# Patient Record
Sex: FEMALE | Race: BLACK OR AFRICAN AMERICAN | Employment: UNEMPLOYED | URBAN - METROPOLITAN AREA
[De-identification: names, ages, dates, MRNs, and addresses within clinical notes are randomized per-mention and may not be internally consistent; named-entity substitution may affect disease eponyms.]

---

## 2017-05-04 ENCOUNTER — ROUTINE PRENATAL (OUTPATIENT)
Dept: PERINATAL CARE | Age: 22
End: 2017-05-04
Payer: COMMERCIAL

## 2017-05-04 VITALS
BODY MASS INDEX: 19.85 KG/M2 | TEMPERATURE: 98.6 F | RESPIRATION RATE: 16 BRPM | SYSTOLIC BLOOD PRESSURE: 114 MMHG | DIASTOLIC BLOOD PRESSURE: 70 MMHG | HEART RATE: 76 BPM | HEIGHT: 69 IN | WEIGHT: 134 LBS

## 2017-05-04 DIAGNOSIS — Z36.86 ENCOUNTER FOR SCREENING FOR RISK OF PRE-TERM LABOR: ICD-10-CM

## 2017-05-04 DIAGNOSIS — O26.12 LOW WEIGHT GAIN IN PREGNANCY, SECOND TRIMESTER: Primary | ICD-10-CM

## 2017-05-04 DIAGNOSIS — Z3A.20 20 WEEKS GESTATION OF PREGNANCY: ICD-10-CM

## 2017-05-04 PROBLEM — O26.10 LOW WEIGHT GAIN IN PREGNANCY: Status: ACTIVE | Noted: 2017-05-04

## 2017-05-04 PROCEDURE — 76811 OB US DETAILED SNGL FETUS: CPT | Performed by: OBSTETRICS & GYNECOLOGY

## 2017-05-04 PROCEDURE — 76817 TRANSVAGINAL US OBSTETRIC: CPT | Performed by: OBSTETRICS & GYNECOLOGY

## 2017-09-18 ENCOUNTER — HOSPITAL ENCOUNTER (OUTPATIENT)
Age: 22
Setting detail: SPECIMEN
Discharge: HOME | End: 2017-09-18
Payer: COMMERCIAL

## 2017-09-19 LAB
C TRACH DNA GENITAL QL NAA+PROBE: NEGATIVE
N. GONORRHOEAE DNA: NEGATIVE

## 2017-09-21 LAB
CULTURE: NORMAL
CULTURE: NORMAL
Lab: NORMAL
SPECIMEN DESCRIPTION: NORMAL
STATUS: NORMAL

## 2018-02-05 ENCOUNTER — HOSPITAL ENCOUNTER (OUTPATIENT)
Age: 23
Setting detail: SPECIMEN
Discharge: HOME | End: 2018-02-05
Payer: COMMERCIAL

## 2018-03-18 ENCOUNTER — HOSPITAL ENCOUNTER (EMERGENCY)
Age: 23
Discharge: HOME | End: 2018-03-18
Attending: EMERGENCY MEDICINE
Payer: COMMERCIAL

## 2018-03-18 VITALS
OXYGEN SATURATION: 100 % | RESPIRATION RATE: 16 BRPM | WEIGHT: 124 LBS | BODY MASS INDEX: 18.37 KG/M2 | HEART RATE: 77 BPM | HEIGHT: 69 IN | SYSTOLIC BLOOD PRESSURE: 129 MMHG | TEMPERATURE: 98.1 F | DIASTOLIC BLOOD PRESSURE: 81 MMHG

## 2018-03-18 DIAGNOSIS — N39.0 ACUTE UTI: Primary | ICD-10-CM

## 2018-03-18 LAB
-: ABNORMAL
AMORPHOUS: ABNORMAL
BACTERIA: ABNORMAL
BILIRUBIN URINE: NEGATIVE
CASTS UA: ABNORMAL /LPF
COLOR: YELLOW
COMMENT UA: ABNORMAL
CRYSTALS, UA: ABNORMAL /HPF
EPITHELIAL CELLS UA: ABNORMAL /HPF
GLUCOSE URINE: NEGATIVE
HCG(URINE) PREGNANCY TEST: NEGATIVE
KETONES, URINE: NEGATIVE
LEUKOCYTE ESTERASE, URINE: ABNORMAL
MUCUS: ABNORMAL
NITRITE, URINE: POSITIVE
OTHER OBSERVATIONS UA: ABNORMAL
PH UA: 6 (ref 5–8)
PROTEIN UA: ABNORMAL
RBC UA: ABNORMAL /HPF
RENAL EPITHELIAL, UA: ABNORMAL /HPF
SPECIFIC GRAVITY UA: 1.02 (ref 1–1.03)
TRICHOMONAS: ABNORMAL
TURBIDITY: ABNORMAL
URINE HGB: ABNORMAL
UROBILINOGEN, URINE: NORMAL
WBC UA: ABNORMAL /HPF
YEAST: ABNORMAL

## 2018-03-18 PROCEDURE — 87591 N.GONORRHOEAE DNA AMP PROB: CPT

## 2018-03-18 PROCEDURE — 84703 CHORIONIC GONADOTROPIN ASSAY: CPT

## 2018-03-18 PROCEDURE — 87186 SC STD MICRODIL/AGAR DIL: CPT

## 2018-03-18 PROCEDURE — 87088 URINE BACTERIA CULTURE: CPT

## 2018-03-18 PROCEDURE — 81001 URINALYSIS AUTO W/SCOPE: CPT

## 2018-03-18 PROCEDURE — 99283 EMERGENCY DEPT VISIT LOW MDM: CPT

## 2018-03-18 PROCEDURE — 6370000000 HC RX 637 (ALT 250 FOR IP): Performed by: EMERGENCY MEDICINE

## 2018-03-18 PROCEDURE — 87491 CHLMYD TRACH DNA AMP PROBE: CPT

## 2018-03-18 PROCEDURE — 87086 URINE CULTURE/COLONY COUNT: CPT

## 2018-03-18 RX ORDER — NITROFURANTOIN 25; 75 MG/1; MG/1
100 CAPSULE ORAL ONCE
Status: COMPLETED | OUTPATIENT
Start: 2018-03-18 | End: 2018-03-18

## 2018-03-18 RX ORDER — IBUPROFEN 600 MG/1
600 TABLET ORAL ONCE
Status: COMPLETED | OUTPATIENT
Start: 2018-03-18 | End: 2018-03-18

## 2018-03-18 RX ORDER — PHENAZOPYRIDINE HYDROCHLORIDE 200 MG/1
200 TABLET, FILM COATED ORAL
Status: DISCONTINUED | OUTPATIENT
Start: 2018-03-18 | End: 2018-03-18 | Stop reason: HOSPADM

## 2018-03-18 RX ORDER — IBUPROFEN 600 MG/1
600 TABLET ORAL EVERY 6 HOURS PRN
Qty: 50 TABLET | Refills: 0 | Status: SHIPPED | OUTPATIENT
Start: 2018-03-18 | End: 2019-05-08

## 2018-03-18 RX ORDER — NITROFURANTOIN 25; 75 MG/1; MG/1
100 CAPSULE ORAL 2 TIMES DAILY
Qty: 14 CAPSULE | Refills: 0 | Status: SHIPPED | OUTPATIENT
Start: 2018-03-18 | End: 2018-03-25

## 2018-03-18 RX ADMIN — NITROFURANTOIN (MONOHYDRATE/MACROCRYSTALS) 100 MG: 75; 25 CAPSULE ORAL at 15:01

## 2018-03-18 RX ADMIN — IBUPROFEN 600 MG: 600 TABLET, FILM COATED ORAL at 15:01

## 2018-03-18 RX ADMIN — PHENAZOPYRIDINE HYDROCHLORIDE 200 MG: 200 TABLET ORAL at 15:23

## 2018-03-18 ASSESSMENT — PAIN DESCRIPTION - PAIN TYPE: TYPE: ACUTE PAIN

## 2018-03-18 ASSESSMENT — PAIN SCALES - GENERAL
PAINLEVEL_OUTOF10: 10
PAINLEVEL_OUTOF10: 10

## 2018-03-18 ASSESSMENT — PAIN DESCRIPTION - LOCATION: LOCATION: PELVIS

## 2018-03-18 ASSESSMENT — PAIN DESCRIPTION - DESCRIPTORS: DESCRIPTORS: CRAMPING;STABBING

## 2018-03-18 ASSESSMENT — PAIN DESCRIPTION - FREQUENCY: FREQUENCY: CONTINUOUS

## 2018-03-18 NOTE — ED PROVIDER NOTES
[address]  eMERGENCY dEPARTMENT eNCOUnter    Pt Name: Heber Swift  MRN: [id number]  [place] 1995  Date of evaluation: 3/18/18  CHIEF COMPLAINT       Chief Complaint   Patient presents with    Dysuria    Hematuria    Pelvic Pain     HISTORY OF PRESENT ILLNESS   HPI  17-year-old female with past medical history as listed below presents the ER with suprapubic pain and urinary symptoms. Patient states his symptoms started on Friday with urinary frequency and discomfort with urination. Pt states today she noticed some blood in her urine. Patient denies vaginal bleeding, discharge or pelvic pain  REVIEW OF SYSTEMS     Review of Systems   All other systems reviewed and are negative. PAST MEDICAL HISTORY   History reviewed. No pertinent past medical history. SURGICAL HISTORY     History reviewed. No pertinent surgical history. CURRENT MEDICATIONS       Discharge Medication List as of 3/18/2018  3:09 PM      CONTINUE these medications which have NOT CHANGED    Details   Prenatal Vit-Fe Fumarate-FA (PRENATAL VITAMIN PO) Take 1 tablet by mouth dailyHistorical Med           ALLERGIES     has No Known Allergies. FAMILY HISTORY     has no family status information on file. SOCIAL HISTORY      reports that she has quit smoking. She does not have any smokeless tobacco history on file. She reports that she does not drink alcohol or use drugs. PHYSICAL EXAM     INITIAL VITALS: /81   Pulse 77   Temp 98.1 °F (36.7 °C) (Oral)   Resp 16   Ht 5' 9\" (1.753 m)   Wt 124 lb (56.2 kg)   LMP 03/12/2018   SpO2 100%   BMI 18.31 kg/m²    Physical Exam   Constitutional: She is oriented to person, place, and time. She appears well-developed and well-nourished. No distress. HENT:   Head: Normocephalic and atraumatic. Nose: Nose normal.   Mouth/Throat: Oropharynx is clear and moist.   Eyes: Conjunctivae and EOM are normal. Pupils are equal, round, and reactive to light.    Neck: Normal range of C. TRACHOMATIS N.GONORRHOEAE DNA, URINE   URINE CULTURE CLEAN CATCH   PREGNANCY, URINE     EMERGENCY DEPARTMENT COURSE:     The patient vitals are shown below. The patient is given primary care follow up for blood pressure check. See [facility] for follow up instructions. Vitals:    Vitals:    03/18/18 1418   BP: 129/81   Pulse: 77   Resp: 16   Temp: 98.1 °F (36.7 °C)   TempSrc: Oral   SpO2: 100%   Weight: 124 lb (56.2 kg)   Height: 5' 9\" (1.753 m)       The patient was given the following medications while in the emergency department:  Orders Placed This Encounter   Medications    ibuprofen (ADVIL;MOTRIN) tablet 600 mg    DISCONTD: phenazopyridine (PYRIDIUM) tablet 200 mg    nitrofurantoin (macrocrystal-monohydrate) (MACROBID) capsule 100 mg    ibuprofen (IBU) 600 MG tablet     Sig: Take 1 tablet by mouth every 6 hours as needed for Pain     Dispense:  50 tablet     Refill:  0    nitrofurantoin, macrocrystal-monohydrate, (MACROBID) 100 MG capsule     Sig: Take 1 capsule by mouth 2 times daily for 7 days     Dispense:  14 capsule     Refill:  0     CONSULTS:  None    FINAL IMPRESSION      1. Acute UTI          DISPOSITION/PLAN   DISPOSITION        PATIENT REFERRED TO:  No follow-up provider specified. DISCHARGE MEDICATIONS:  Discharge Medication List as of 3/18/2018  3:09 PM      START taking these medications    Details   ibuprofen (IBU) 600 MG tablet Take 1 tablet by mouth every 6 hours as needed for Pain, Disp-50 tablet, R-0Print      nitrofurantoin, macrocrystal-monohydrate, (MACROBID) 100 MG capsule Take 1 capsule by mouth 2 times daily for 7 days, Disp-14 capsule, R-0Print           Violeta Montenegro MD  Attending Emergency Physician  [employer] voice recognition software used in portions of this document.                    Violeta Montenegro MD  03/18/18 [place]

## 2018-03-19 LAB
CULTURE: ABNORMAL
CULTURE: ABNORMAL
Lab: ABNORMAL
ORGANISM: ABNORMAL
SPECIMEN DESCRIPTION: ABNORMAL
SPECIMEN DESCRIPTION: ABNORMAL
STATUS: ABNORMAL

## 2018-03-20 LAB
C. TRACHOMATIS DNA ,URINE: NEGATIVE
N. GONORRHOEAE DNA, URINE: NEGATIVE

## 2018-10-24 ENCOUNTER — HOSPITAL ENCOUNTER (OUTPATIENT)
Age: 23
Setting detail: SPECIMEN
Discharge: HOME | End: 2018-10-24
Payer: COMMERCIAL

## 2018-10-25 LAB
C TRACH DNA GENITAL QL NAA+PROBE: NEGATIVE
DIRECT EXAM: ABNORMAL
Lab: ABNORMAL
N. GONORRHOEAE DNA: NEGATIVE
SPECIMEN DESCRIPTION: ABNORMAL
STATUS: ABNORMAL

## 2018-11-09 ENCOUNTER — HOSPITAL ENCOUNTER (EMERGENCY)
Age: 23
Discharge: HOME | End: 2018-11-09
Attending: EMERGENCY MEDICINE
Payer: COMMERCIAL

## 2018-11-09 ENCOUNTER — APPOINTMENT (OUTPATIENT)
Dept: GENERAL RADIOLOGY | Age: 23
End: 2018-11-09
Payer: COMMERCIAL

## 2018-11-09 VITALS
HEIGHT: 66 IN | BODY MASS INDEX: 19.93 KG/M2 | DIASTOLIC BLOOD PRESSURE: 93 MMHG | HEART RATE: 73 BPM | RESPIRATION RATE: 16 BRPM | SYSTOLIC BLOOD PRESSURE: 142 MMHG | WEIGHT: 124 LBS | OXYGEN SATURATION: 99 % | TEMPERATURE: 97.7 F

## 2018-11-09 DIAGNOSIS — T78.40XA ALLERGIC REACTION, INITIAL ENCOUNTER: ICD-10-CM

## 2018-11-09 DIAGNOSIS — J06.9 UPPER RESPIRATORY TRACT INFECTION, UNSPECIFIED TYPE: Primary | ICD-10-CM

## 2018-11-09 LAB
EKG ATRIAL RATE: 73 BPM
EKG P AXIS: 48 DEGREES
EKG P-R INTERVAL: 156 MS
EKG Q-T INTERVAL: 390 MS
EKG QRS DURATION: 76 MS
EKG QTC CALCULATION (BAZETT): 429 MS
EKG R AXIS: 63 DEGREES
EKG T AXIS: 57 DEGREES
EKG VENTRICULAR RATE: 73 BPM
HCG(URINE) PREGNANCY TEST: NEGATIVE

## 2018-11-09 PROCEDURE — 94761 N-INVAS EAR/PLS OXIMETRY MLT: CPT

## 2018-11-09 PROCEDURE — 96372 THER/PROPH/DIAG INJ SC/IM: CPT

## 2018-11-09 PROCEDURE — 6360000002 HC RX W HCPCS: Performed by: EMERGENCY MEDICINE

## 2018-11-09 PROCEDURE — 6370000000 HC RX 637 (ALT 250 FOR IP): Performed by: EMERGENCY MEDICINE

## 2018-11-09 PROCEDURE — 71046 X-RAY EXAM CHEST 2 VIEWS: CPT

## 2018-11-09 PROCEDURE — 93005 ELECTROCARDIOGRAM TRACING: CPT

## 2018-11-09 PROCEDURE — 94640 AIRWAY INHALATION TREATMENT: CPT

## 2018-11-09 PROCEDURE — 84703 CHORIONIC GONADOTROPIN ASSAY: CPT

## 2018-11-09 PROCEDURE — 94664 DEMO&/EVAL PT USE INHALER: CPT

## 2018-11-09 PROCEDURE — 99285 EMERGENCY DEPT VISIT HI MDM: CPT

## 2018-11-09 RX ORDER — ACETAMINOPHEN 500 MG
500 TABLET ORAL EVERY 6 HOURS PRN
COMMUNITY
End: 2019-05-08

## 2018-11-09 RX ORDER — PREDNISONE 10 MG/1
TABLET ORAL
Qty: 12 TABLET | Refills: 0 | Status: SHIPPED | OUTPATIENT
Start: 2018-11-10 | End: 2018-11-19

## 2018-11-09 RX ORDER — ALBUTEROL SULFATE 90 UG/1
1-2 AEROSOL, METERED RESPIRATORY (INHALATION) EVERY 4 HOURS PRN
Qty: 1 INHALER | Refills: 0 | Status: SHIPPED | OUTPATIENT
Start: 2018-11-09 | End: 2019-05-08

## 2018-11-09 RX ORDER — FAMOTIDINE 20 MG/1
20 TABLET, FILM COATED ORAL ONCE
Status: COMPLETED | OUTPATIENT
Start: 2018-11-09 | End: 2018-11-09

## 2018-11-09 RX ORDER — DEXAMETHASONE SODIUM PHOSPHATE 4 MG/ML
6 INJECTION, SOLUTION INTRA-ARTICULAR; INTRALESIONAL; INTRAMUSCULAR; INTRAVENOUS; SOFT TISSUE ONCE
Status: COMPLETED | OUTPATIENT
Start: 2018-11-09 | End: 2018-11-09

## 2018-11-09 RX ORDER — DIPHENHYDRAMINE HCL 25 MG
50 TABLET ORAL EVERY 6 HOURS PRN
Status: DISCONTINUED | OUTPATIENT
Start: 2018-11-09 | End: 2018-11-09 | Stop reason: HOSPADM

## 2018-11-09 RX ORDER — DIPHENHYDRAMINE HCL 25 MG
25 CAPSULE ORAL EVERY 4 HOURS PRN
Qty: 20 CAPSULE | Refills: 0 | Status: SHIPPED | OUTPATIENT
Start: 2018-11-09 | End: 2018-11-19

## 2018-11-09 RX ADMIN — DIPHENHYDRAMINE HCL 50 MG: 25 TABLET ORAL at 10:04

## 2018-11-09 RX ADMIN — ALBUTEROL SULFATE 5 MG: 2.5 SOLUTION RESPIRATORY (INHALATION) at 10:20

## 2018-11-09 RX ADMIN — DEXAMETHASONE SODIUM PHOSPHATE 6 MG: 4 INJECTION, SOLUTION INTRAMUSCULAR; INTRAVENOUS at 10:05

## 2018-11-09 RX ADMIN — FAMOTIDINE 20 MG: 20 TABLET, FILM COATED ORAL at 10:04

## 2018-11-09 ASSESSMENT — ENCOUNTER SYMPTOMS
NAUSEA: 0
COUGH: 1
VOMITING: 0
SHORTNESS OF BREATH: 1
BACK PAIN: 0
WHEEZING: 1

## 2018-11-09 ASSESSMENT — PAIN SCALES - GENERAL: PAINLEVEL_OUTOF10: 10

## 2018-11-09 NOTE — ED PROVIDER NOTES
1 tablet by mouth every 6 hours as needed for Pain, Disp-50 tablet, R-0Print      Prenatal Vit-Fe Fumarate-FA (PRENATAL VITAMIN PO) Take 1 tablet by mouth dailyHistorical Med             ALLERGIES     has No Known Allergies. FAMILY HISTORY     has no family status information on file. SOCIAL HISTORY      reports that she has quit smoking. She does not have any smokeless tobacco history on file. She reports that she does not drink alcohol or use drugs. PHYSICAL EXAM     INITIAL VITALS: BP (!) 142/93   Pulse 73   Temp 97.7 °F (36.5 °C)   Resp 16   Ht 5' 6\" (1.676 m)   Wt 124 lb (56.2 kg)   LMP 11/04/2018   SpO2 99%   BMI 20.01 kg/m²   Gen: NAD  Head: NC/at  Eyes: PERRL, anicteric, no conjunctivitis. ENT: TM clear bilaterally. Pharynx is non-erythematous. No tonsillar hypertrophy or exudates, uvula is midline. No evidence of a pharyngeal abscess. Mild edema to the lower lobes. There is clear rhinorrhea. Neck: No adenopathy. No JVD. No stridor  CVS: RRR  PULM: CTA  ABD: Soft, no TTP  MSK: Normal muscle bulk. No CVA TTTP  SKIN: No rash. Neuro: A&Ox3, appropriate movement of all extremities, ambulatory with a normal gait, fluent speech. Extremities: No edema to the hands. MEDICAL DECISION MAKING:     MDM  51-year-old with a few days of an upper respiratory infection with allergic symptoms from last night. There is no sign of any stridor or airway compromise. We'll give a breathing treatment for this cough that she's been having. We'll give her dose of steroids Benadryl and Pepcid. We'll check an EKG and chest x-ray and reassess. Hospital course:    Chest x-ray is unremarkable there is no pneumonia. No pneumothorax. EKG shows a normal sinus rhythm and no ischemic changes. No sign of pericarditis. Pt reassessed, edema improved. D/w pt treatment plan, warning precautions for prompt ED return and importance of close OP FU.          DIAGNOSTIC RESULTS     EKG: All EKG's are interpreted by the Emergency Department Physician who either signs or Co-signs this chart in the absence of a cardiologist.    EKG shows normal sinus rhythm, heart rate of 73, , QRS of 76, QTC of 429ST segment elevations or depressions no T-wave inversions and the axis is normal.    RADIOLOGY:All plain film, CT, MRI, and formal ultrasound images (except ED bedside ultrasound) are read by the radiologist and the images and interpretations are directly viewed by the emergency physician. XR CHEST STANDARD (2 VW)   Final Result   Negative chest.             LABS: All lab results were reviewed by myself, and all abnormals are listed below. Labs Reviewed   PREGNANCY, URINE       EMERGENCY DEPARTMENT COURSE:   Vitals:    Vitals:    11/09/18 0916 11/09/18 1021   BP: (!) 142/93    Pulse: 73    Resp: 16    Temp: 97.7 °F (36.5 °C)    SpO2: 99% 99%   Weight: 124 lb (56.2 kg)    Height: 5' 6\" (1.676 m)        The patient was given the following medications while in the emergency department:  Orders Placed This Encounter   Medications    DISCONTD: diphenhydrAMINE (BENADRYL) tablet 50 mg    famotidine (PEPCID) tablet 20 mg    dexamethasone (DECADRON) injection 6 mg    albuterol (PROVENTIL) nebulizer solution 5 mg    predniSONE (DELTASONE) 10 MG tablet     Sig: Take 4 tablets by mouth once daily for 3 days     Dispense:  12 tablet     Refill:  0    diphenhydrAMINE (BENADRYL) 25 MG capsule     Sig: Take 1 capsule by mouth every 4 hours as needed for Itching     Dispense:  20 capsule     Refill:  0    albuterol sulfate HFA (PROVENTIL HFA) 108 (90 Base) MCG/ACT inhaler     Sig: Inhale 1-2 puffs into the lungs every 4 hours as needed for Wheezing     Dispense:  1 Inhaler     Refill:  0     -------------------------  CRITICAL CARE:   CONSULTS: None  PROCEDURES: Procedures     FINAL IMPRESSION      1. Upper respiratory tract infection, unspecified type    2.  Allergic reaction, initial encounter          DISPOSITION/PLAN

## 2019-03-25 ENCOUNTER — HOSPITAL ENCOUNTER (OUTPATIENT)
Age: 24
Setting detail: SPECIMEN
Discharge: HOME | End: 2019-03-25
Payer: COMMERCIAL

## 2019-03-26 LAB
DIRECT EXAM: ABNORMAL
Lab: ABNORMAL
SPECIMEN DESCRIPTION: ABNORMAL

## 2019-03-28 LAB
CHLAMYDIA BY THIN PREP: NEGATIVE
N. GONORRHOEAE DNA, THIN PREP: NEGATIVE
SPECIMEN DESCRIPTION: NORMAL
SPECIMEN DESCRIPTION: NORMAL

## 2019-05-08 ENCOUNTER — HOSPITAL ENCOUNTER (EMERGENCY)
Age: 24
Discharge: HOME | End: 2019-05-08
Attending: EMERGENCY MEDICINE
Payer: COMMERCIAL

## 2019-05-08 VITALS
SYSTOLIC BLOOD PRESSURE: 110 MMHG | RESPIRATION RATE: 18 BRPM | OXYGEN SATURATION: 100 % | HEIGHT: 69 IN | TEMPERATURE: 98.9 F | HEART RATE: 64 BPM | DIASTOLIC BLOOD PRESSURE: 78 MMHG | BODY MASS INDEX: 19.26 KG/M2 | WEIGHT: 130 LBS

## 2019-05-08 DIAGNOSIS — L50.9 URTICARIA: Primary | ICD-10-CM

## 2019-05-08 PROCEDURE — 99282 EMERGENCY DEPT VISIT SF MDM: CPT

## 2019-05-08 PROCEDURE — 6370000000 HC RX 637 (ALT 250 FOR IP): Performed by: STUDENT IN AN ORGANIZED HEALTH CARE EDUCATION/TRAINING PROGRAM

## 2019-05-08 RX ORDER — CETIRIZINE HYDROCHLORIDE 10 MG/1
10 TABLET ORAL DAILY
Qty: 14 TABLET | Refills: 0 | Status: SHIPPED | OUTPATIENT
Start: 2019-05-08 | End: 2019-05-22

## 2019-05-08 RX ORDER — CETIRIZINE HYDROCHLORIDE 10 MG/1
10 TABLET ORAL ONCE
Status: COMPLETED | OUTPATIENT
Start: 2019-05-08 | End: 2019-05-08

## 2019-05-08 RX ADMIN — CETIRIZINE HYDROCHLORIDE 10 MG: 10 TABLET ORAL at 10:40

## 2019-05-08 ASSESSMENT — ENCOUNTER SYMPTOMS
EYE ITCHING: 0
SHORTNESS OF BREATH: 0
FACIAL SWELLING: 0
COUGH: 0
NAUSEA: 0
ABDOMINAL PAIN: 0
VOMITING: 0
EYE REDNESS: 0

## 2019-05-08 ASSESSMENT — PAIN DESCRIPTION - ONSET: ONSET: SUDDEN

## 2019-05-08 ASSESSMENT — PAIN DESCRIPTION - PROGRESSION: CLINICAL_PROGRESSION: NOT CHANGED

## 2019-05-08 ASSESSMENT — PAIN - FUNCTIONAL ASSESSMENT: PAIN_FUNCTIONAL_ASSESSMENT: ACTIVITIES ARE NOT PREVENTED

## 2019-05-08 ASSESSMENT — PAIN SCALES - GENERAL: PAINLEVEL_OUTOF10: 10

## 2019-05-08 ASSESSMENT — PAIN DESCRIPTION - LOCATION: LOCATION: GENERALIZED

## 2019-05-08 ASSESSMENT — PAIN DESCRIPTION - DESCRIPTORS: DESCRIPTORS: ITCHING;BURNING

## 2019-05-08 ASSESSMENT — PAIN DESCRIPTION - FREQUENCY: FREQUENCY: CONTINUOUS

## 2019-05-08 NOTE — CARE COORDINATION
Case Management Initial Discharge Plan  Mahesh Pollack,             Met with:patient to discuss discharge plans. Information verified: address, contacts, phone number, , insurance Yes  PCP: [id number]   Date of last visit: mayte    Insurance Provider: [facility] [place]    Discharge Planning    Living Arrangements:  Spouse/Significant Other, Children   Support Systems:  Spouse/Significant Other, Parent    Home has 2 stories  0 stairs to climb to get into front door, 16 stairs to climb to reach second floor  Location of bedroom/bathroom in home 2nd floor    Patient able to perform ADL's:Independent    Current Services (outpatient & in home) n/a  DME equipment: none  DME provider: n/a    Pharmacy: n/a    Potential Assistance Purchasing Medications:  No  Does patient want to participate in local refill/ meds to beds program?  No    Potential Assistance Needed:  N/A    Patient agreeable to home care: No  [place] of choice provided:  yes    Prior SNF/Rehab Placement and Facility: none  Agreeable to SNF/Rehab: No  [place] of choice provided: yes   Evaluation: n/an    Expected Discharge date:  19  Patient expects to be discharged to:  Home independently  Follow Up Appointment: Best Day/ Time: (TBD)    Transportation provider: Private car  Transportation arrangements needed for discharge: No    Readmission Risk              Risk of Unplanned Readmission:        0             Does patient have a readmission risk score greater than 14?: No  If yes, follow-up appointment must be made within 7 days of discharge. Discharge Plan: Pt to d/c to home via private car (pt drove self).            Electronically signed by Griselda Limon RN on 19 at 11:22 AM

## 2019-05-08 NOTE — ED TRIAGE NOTES
Pt presents to ed aaox3 c/o generalized body itching and burning from rash that was noticed this morning. Pt denies hx allergies, new product use, new food consumption, new plant/bug contact, angioedema, sob. Pt has steady gait, clear lungs, full ROM.

## 2019-05-08 NOTE — ED PROVIDER NOTES
[address]  ED  Emergency Department Encounter  EmergencyMedicine Resident     Pt Denae Mitchell  MRN: [id number]  [place] 1995  Date of evaluation: 5/8/19  PCP:  [address]       Chief Complaint   Patient presents with    Rash     generalized body rash/hives onset this morning       HISTORY OF PRESENT ILLNESS  (Location/Symptom, Timing/Onset, Context/Setting, Quality, Duration, Modifying Factors, Severity.)      Akash Charles is a 26 y.o. female who presents with rash which began this morning. She denies any known no exposure-no new pets, lotions, detergents, perfumes. She states that no one else in her home has similar symptoms. She denies any associated symptoms. She denies any shortness of breath, nausea, vomiting, abdominal pain, throat or lip swelling. Patient admits to a history of similar symptoms. She denies any past medical history. She states that her only medication is an IUD. She states that she has not taken any medications for her current symptoms. PAST MEDICAL / SURGICAL / SOCIAL / FAMILY HISTORY      has no past medical history on file. has no past surgical history on file.     Social History     Socioeconomic History    Marital status: Single     Spouse name: Not on file    Number of children: Not on file    Years of education: Not on file    Highest education level: Not on file   Occupational History    Not on file   Social Needs    Financial resource strain: Not on file    Food insecurity:     Worry: Not on file     Inability: Not on file    Transportation needs:     Medical: Not on file     Non-medical: Not on file   Tobacco Use    Smoking status: Never Smoker    Smokeless tobacco: Never Used   Substance and Sexual Activity    Alcohol use: No    Drug use: No    Sexual activity: Yes     Partners: Male   Lifestyle    Physical activity:     Days per week: Not on file     Minutes per session: Not on file    Stress: Not on file   Relationships    Social connections:     Talks on phone: Not on file     Gets together: Not on file     Attends Anabaptism service: Not on file     Active member of club or organization: Not on file     Attends meetings of clubs or organizations: Not on file     Relationship status: Not on file    Intimate partner violence:     Fear of current or ex partner: Not on file     Emotionally abused: Not on file     Physically abused: Not on file     Forced sexual activity: Not on file   Other Topics Concern    Not on file   Social History Narrative    Not on file       No family history on file. Allergies:  Patient has no known allergies. Home Medications:  Prior to Admission medications    Medication Sig Start Date End Date Taking? Authorizing Provider   cetirizine (ZYRTEC) 10 MG tablet Take 1 tablet by mouth daily for 14 days 5/8/19 5/22/19 Yes Jennifer Guzman MD       REVIEW OF SYSTEMS    (2-9 systems for level 4, 10 or more for level 5)      Review of Systems   Constitutional: Negative for chills, fatigue and fever. HENT: Negative for congestion, facial swelling and sneezing. Eyes: Negative for redness and itching. Respiratory: Negative for cough and shortness of breath. Cardiovascular: Negative for chest pain. Gastrointestinal: Negative for abdominal pain, nausea and vomiting. Musculoskeletal: Negative for myalgias. Skin: Positive for rash. PHYSICAL EXAM   (up to 7 for level 4, 8 or more for level 5)      INITIAL VITALS:   /78   Pulse 64   Temp 98.9 °F (37.2 °C) (Oral)   Resp 18   Ht 5' 9\" (1.753 m)   Wt 130 lb (59 kg)   LMP 04/23/2019 (Exact Date)   SpO2 100%   BMI 19.20 kg/m²     Physical Exam   Constitutional: She appears well-developed and well-nourished. No distress. HENT:   Head: Normocephalic and atraumatic. Mouth/Throat: Oropharynx is clear and moist.   Eyes: Pupils are equal, round, and reactive to light.  Conjunctivae and EOM are normal.   Cardiovascular: Normal rate, regular rhythm and normal heart sounds. Exam reveals no friction rub. No murmur heard. Pulmonary/Chest: Effort normal and breath sounds normal. No stridor. No respiratory distress. She has no wheezes. Abdominal: Soft. Bowel sounds are normal. She exhibits no distension and no mass. There is no tenderness. There is no guarding. Musculoskeletal: She exhibits no edema. Skin: Skin is warm. Capillary refill takes less than 2 seconds. Diffuse raised urticarial rash of trunk and extremities       DIFFERENTIAL  DIAGNOSIS     PLAN (LABS / IMAGING / EKG):  No orders of the defined types were placed in this encounter. MEDICATIONS ORDERED:  Orders Placed This Encounter   Medications    cetirizine (ZYRTEC) tablet 10 mg    cetirizine (ZYRTEC) 10 MG tablet     Sig: Take 1 tablet by mouth daily for 14 days     Dispense:  14 tablet     Refill:  0       DDX: hives, viral exanthem, contact dermatitis    DIAGNOSTIC RESULTS / EMERGENCY DEPARTMENT COURSE / MDM     LABS:  No results found for this visit on 05/08/19. IMPRESSION: urticaria    RADIOLOGY:  None    EKG  None    All EKG's are interpreted by the Emergency Department Physician who either signs or Co-signs this chart in the absence of a cardiologist.    EMERGENCY DEPARTMENT COURSE:  Patient is alert and oriented. No acute distress. Vitals within normal limits. Afebrile. No systemic symptoms. No SOB, nausea, vomiting, shortness of breath, facial/throat swelling. Rash is consistent with urticaria. Patient was encouraged to follow up with PCP. She was encouraged to return to the emergency department with any worsening symptoms. She voiced her understanding. No known new exposures. Will discharge for outpatient follow up as needed. PROCEDURES:  None    CONSULTS:  None    CRITICAL CARE:  None    FINAL IMPRESSION      1.  Urticaria          DISPOSITION / PLAN     DISPOSITION Decision To Discharge 05/08/2019 10:49:02 AM      PATIENT REFERRED TO:  [address] [address]  [phone]    Schedule an appointment as soon as possible for a visit       [facility] ED  [facility]  [phone]    If symptoms worsen      DISCHARGE MEDICATIONS:  New Prescriptions    CETIRIZINE (ZYRTEC) 10 MG TABLET    Take 1 tablet by mouth daily for 14 days       [address]   Emergency Medicine Resident    (Please note that portions ofthis note were completed with a voice recognition program.  Efforts were made to edit the dictations but occasionally words are mis-transcribed.)     Jaycob Stanton MD  05/08/19 1100

## 2019-05-08 NOTE — ED PROVIDER NOTES
[facility]  Emergency Department  Faculty Attestation     I performed a history and physical examination of the patient and discussed management with the resident. I reviewed the residents note and agree with the documented findings and plan of care. Any areas of disagreement are noted on the chart. I was personally present for the key portions of any procedures. I have documented in the chart those procedures where I was not present during the key portions. I have reviewed the emergency nurses triage note. I agree with the chief complaint, past medical history, past surgical history, allergies, medications, social and family history as documented unless otherwise noted below. For Physician Assistant/ Nurse Practitioner cases/documentation I have personally evaluated this patient and have completed at least one if not all key elements of the E/M (history, physical exam, and MDM). Additional findings are as noted. Primary Care Physician:  [facility]    Screenings:  [unfilled]    CHIEF COMPLAINT       Chief Complaint   Patient presents with    Rash     generalized body rash/hives onset this morning       RECENT VITALS:   Temp: 98.9 °F (37.2 °C),  Pulse: 64, Resp: 18, BP: 110/78    LABS:  Labs Reviewed - No data to display    Radiology  No orders to display         Attending Physician Additional  Notes    Patient has diffuse urticaria with itching. No angioedema wheezing shortness of breath abdominal pain or faintness. No known allergens. She's had this previously. On exam she nontoxic afebrile vital signs normal.  She has urticaria. Lungs are clear. No angioedema noted. Abdomen is benign. Plan is antihistamines and follow-up. Quirino Ghosh.  Irina Jaime MD, [facility]  Attending Emergency  Physician                Liu Bhagat MD  05/08/19 4192

## 2019-10-22 ENCOUNTER — HOSPITAL ENCOUNTER (OUTPATIENT)
Age: 24
Setting detail: SPECIMEN
Discharge: HOME | End: 2019-10-22
Payer: COMMERCIAL

## 2019-10-22 LAB — TSH SERPL DL<=0.05 MIU/L-ACNC: 0.81 MIU/L (ref 0.3–5)

## 2019-10-23 LAB
DIRECT EXAM: NORMAL
Lab: NORMAL
SPECIMEN DESCRIPTION: NORMAL

## 2019-10-24 LAB
C TRACH DNA GENITAL QL NAA+PROBE: NEGATIVE
N. GONORRHOEAE DNA: NEGATIVE
SPECIMEN DESCRIPTION: NORMAL

## 2019-10-25 LAB — CYTOLOGY REPORT: NORMAL

## 2019-11-06 ENCOUNTER — OFFICE VISIT (OUTPATIENT)
Dept: DERMATOLOGY | Age: 24
End: 2019-11-06
Payer: COMMERCIAL

## 2019-11-06 ENCOUNTER — HOSPITAL ENCOUNTER (OUTPATIENT)
Age: 24
Setting detail: SPECIMEN
Discharge: HOME | End: 2019-11-06
Payer: COMMERCIAL

## 2019-11-06 VITALS
DIASTOLIC BLOOD PRESSURE: 74 MMHG | OXYGEN SATURATION: 98 % | HEART RATE: 75 BPM | WEIGHT: 124.8 LBS | SYSTOLIC BLOOD PRESSURE: 115 MMHG | HEIGHT: 69 IN | BODY MASS INDEX: 18.48 KG/M2

## 2019-11-06 DIAGNOSIS — L23.89 OTHER ALLERGIC CONTACT DERMATITIS: Primary | ICD-10-CM

## 2019-11-06 DIAGNOSIS — D48.5 NEOPLASM OF UNCERTAIN BEHAVIOR OF SKIN: ICD-10-CM

## 2019-11-06 DIAGNOSIS — L73.2 HIDRADENITIS: ICD-10-CM

## 2019-11-06 PROCEDURE — 99202 OFFICE O/P NEW SF 15 MIN: CPT | Performed by: DERMATOLOGY

## 2019-11-06 PROCEDURE — G8419 CALC BMI OUT NRM PARAM NOF/U: HCPCS | Performed by: DERMATOLOGY

## 2019-11-06 PROCEDURE — 11104 PUNCH BX SKIN SINGLE LESION: CPT | Performed by: DERMATOLOGY

## 2019-11-06 PROCEDURE — G8427 DOCREV CUR MEDS BY ELIG CLIN: HCPCS | Performed by: DERMATOLOGY

## 2019-11-06 PROCEDURE — G8484 FLU IMMUNIZE NO ADMIN: HCPCS | Performed by: DERMATOLOGY

## 2019-11-06 PROCEDURE — 1036F TOBACCO NON-USER: CPT | Performed by: DERMATOLOGY

## 2019-11-06 RX ORDER — LIDOCAINE HYDROCHLORIDE AND EPINEPHRINE 10; 10 MG/ML; UG/ML
0.5 INJECTION, SOLUTION INFILTRATION; PERINEURAL ONCE
Status: COMPLETED | OUTPATIENT
Start: 2019-11-06 | End: 2019-11-06

## 2019-11-06 RX ORDER — TRIAMCINOLONE ACETONIDE 1 MG/G
CREAM TOPICAL
Qty: 454 G | Refills: 1 | Status: SHIPPED | OUTPATIENT
Start: 2019-11-06

## 2019-11-06 RX ORDER — PREDNISONE 20 MG/1
TABLET ORAL
Qty: 25 TABLET | Refills: 0 | Status: SHIPPED | OUTPATIENT
Start: 2019-11-06 | End: 2019-12-02 | Stop reason: ALTCHOICE

## 2019-11-06 RX ADMIN — LIDOCAINE HYDROCHLORIDE AND EPINEPHRINE 0.5 ML: 10; 10 INJECTION, SOLUTION INFILTRATION; PERINEURAL at 16:34

## 2019-11-11 LAB — DERMATOLOGY PATHOLOGY REPORT: NORMAL

## 2019-12-02 ENCOUNTER — OFFICE VISIT (OUTPATIENT)
Dept: DERMATOLOGY | Age: 24
End: 2019-12-02
Payer: COMMERCIAL

## 2019-12-02 VITALS
DIASTOLIC BLOOD PRESSURE: 77 MMHG | HEIGHT: 69 IN | OXYGEN SATURATION: 99 % | WEIGHT: 128.8 LBS | BODY MASS INDEX: 19.08 KG/M2 | HEART RATE: 64 BPM | SYSTOLIC BLOOD PRESSURE: 117 MMHG

## 2019-12-02 DIAGNOSIS — L73.2 HIDRADENITIS: ICD-10-CM

## 2019-12-02 DIAGNOSIS — L70.0 ACNE VULGARIS: Primary | ICD-10-CM

## 2019-12-02 PROCEDURE — G8427 DOCREV CUR MEDS BY ELIG CLIN: HCPCS | Performed by: DERMATOLOGY

## 2019-12-02 PROCEDURE — G8420 CALC BMI NORM PARAMETERS: HCPCS | Performed by: DERMATOLOGY

## 2019-12-02 PROCEDURE — G8484 FLU IMMUNIZE NO ADMIN: HCPCS | Performed by: DERMATOLOGY

## 2019-12-02 PROCEDURE — 1036F TOBACCO NON-USER: CPT | Performed by: DERMATOLOGY

## 2019-12-02 PROCEDURE — 99213 OFFICE O/P EST LOW 20 MIN: CPT | Performed by: DERMATOLOGY

## 2019-12-02 RX ORDER — SPIRONOLACTONE 50 MG/1
TABLET, FILM COATED ORAL
Qty: 30 TABLET | Refills: 2 | Status: SHIPPED | OUTPATIENT
Start: 2019-12-02 | End: 2020-08-17 | Stop reason: ALTCHOICE

## 2019-12-02 RX ORDER — CLINDAMYCIN PHOSPHATE 10 UG/ML
LOTION TOPICAL
Qty: 60 ML | Refills: 3 | Status: SHIPPED | OUTPATIENT
Start: 2019-12-02

## 2020-04-02 ENCOUNTER — HOSPITAL ENCOUNTER (OUTPATIENT)
Age: 25
Setting detail: SPECIMEN
Discharge: HOME | End: 2020-04-02
Payer: COMMERCIAL

## 2020-04-02 LAB
DIRECT EXAM: ABNORMAL
Lab: ABNORMAL
SPECIMEN DESCRIPTION: ABNORMAL

## 2020-05-08 ENCOUNTER — TELEPHONE (OUTPATIENT)
Dept: DERMATOLOGY | Age: 25
End: 2020-05-08

## 2020-05-08 NOTE — TELEPHONE ENCOUNTER
Please fax #is: [phone] office notes for derm appt on   12/2/2019,  to ATTN: Jean Claude/Medical Records at [facility].

## 2020-08-17 ENCOUNTER — OFFICE VISIT (OUTPATIENT)
Dept: DERMATOLOGY | Age: 25
End: 2020-08-17
Payer: COMMERCIAL

## 2020-08-17 VITALS
WEIGHT: 123 LBS | SYSTOLIC BLOOD PRESSURE: 110 MMHG | HEART RATE: 67 BPM | OXYGEN SATURATION: 96 % | DIASTOLIC BLOOD PRESSURE: 66 MMHG | BODY MASS INDEX: 18.22 KG/M2 | HEIGHT: 69 IN | TEMPERATURE: 97.9 F

## 2020-08-17 PROCEDURE — G8427 DOCREV CUR MEDS BY ELIG CLIN: HCPCS | Performed by: DERMATOLOGY

## 2020-08-17 PROCEDURE — G8419 CALC BMI OUT NRM PARAM NOF/U: HCPCS | Performed by: DERMATOLOGY

## 2020-08-17 PROCEDURE — 99213 OFFICE O/P EST LOW 20 MIN: CPT | Performed by: DERMATOLOGY

## 2020-08-17 PROCEDURE — 1036F TOBACCO NON-USER: CPT | Performed by: DERMATOLOGY

## 2020-08-17 RX ORDER — TRETINOIN 0.5 MG/G
CREAM TOPICAL
Qty: 45 G | Refills: 3 | Status: SHIPPED | OUTPATIENT
Start: 2020-08-17

## 2020-08-17 RX ORDER — SPIRONOLACTONE 50 MG/1
TABLET, FILM COATED ORAL
Qty: 30 TABLET | Refills: 2 | Status: SHIPPED | OUTPATIENT
Start: 2020-08-17

## 2020-08-17 NOTE — PROGRESS NOTES
Wt 123 lb (55.8 kg)   SpO2 96%   BMI 18.16 kg/m²     General Exam:  General Appearance: No acute distress, Well nourished     Neuro: Alert and oriented to person, place and time  Psych: Normal affect   Lymph Node: Not performed    Cutaneous Exam: Performed as documented in clinic note below. Acne exam, which includes the head/face, neck, chest, and back was performed    Pertinent Physical Exam Findings:  Physical Exam  Near confluent comedones of lower face with hyperpigmented macules, rare inflammatory papules    Photo surveillance performed: No    Medical Necessity of Exam Performed:   Distribution of patient concerns    Additional Diagnostic Testing performed during exam: Not performed ,  Not performed    ASSESSMENT:   Diagnosis Orders   1. Acne vulgaris  spironolactone (ALDACTONE) 50 MG tablet    tretinoin (RETIN-A) 0.05 % cream    benzoyl peroxide 5 % external liquid       Plan of Action is as Follows:  Assessment   1. Acne vulgaris, primarily comedonal  - STOP vaseline and alcohol on face  - restart spironolactone, tretinoin, BPO wash  - spironolactone (ALDACTONE) 50 MG tablet; Take one pill PO daily  Dispense: 30 tablet; Refill: 2  - tretinoin (RETIN-A) 0.05 % cream; Apply a pea sized amount to face, chest and back nightly  Dispense: 45 g; Refill: 3  - benzoyl peroxide 5 % external liquid; Wash face, chest and back 1-2 times daily  Dispense: 227 g; Refill: 3    RTC 3 months            Patient Instructions   - Stop applying Vaseline to face    Mornin. Wash with over the counter benzoyl peroxide wash (examples include: Panoxyl Wash, Acne Free brand oil-free acne cleanser, Neutrogena Clear Pore Cleanser/Mask, Clean and Clear advantage 3 in 1 exfoliating cleanser, Clean and Clear Continuous Control Acne Cleanser, Oxy maximum face wash). Dry your face with white towel to prevent bleaching of clothing. 2. Apply an oil-free, non-comedogenic moisturizer, ideally with SPF 15+ in it. Night time:   1.  Wash with a gentle face wash (such as Cerave or Cetaphil)  2. Apply a pea-sized amount of Tretinoin cream onto your finger. Dab the medicine onto your forehead, nose, chin, and each cheek. Then gently spread a thin layer across the entire face. Do not wash off this medicine. These medications can also be used on your chest, back and shoulder areas. 3. Apply an oil-free, non-comedogenic moisturizer. 4. Take spironolactone by mouth. Common side effects of spironolactone include increased urination, irregular periods with mid-cycle spotting, breast tenderness, decreased sex drive, fatigue, headache, and dizziness. Stop taking medication and to immediately report any new onset muscle cramps or weakness, or palpitations. Pregnancy needs to be avoided by use of birth control in order to prevent feminization of a male fetus. Stay away from potassium supplements while on the medication as there is a risk of high potassium levels. It is important to remember that oil glands respond very slowly and your skin will not change overnight. Your skin may get worse during the first weeks of treatment because all of the clogged pores are opening to the surface. Try to be consistent and follow these instructions from your doctor. If your acne has not responded to these treatments in 2-3 months, your doctor may recommend other medications. Topical acne medications can cause irritation, redness, and dryness, especially when you first start them. If your prescribed topical cream or gel is too irritating at first, try using it every other day or once every 3 days instead of every day. As your skin becomes less sensitive, you may be able to start to use it every day. To help soothe the dryness, you can use an oil-free, \"non-comedogenic\" moisturizer, ideally with SPF in it. Some products available include: Cetaphil Lotion, Cerave Lotion, Neutrogenia Moisturizer and Aveeno Moisturizer.  Some people find that applying their

## 2020-08-17 NOTE — PATIENT INSTRUCTIONS
- Stop applying Vaseline to face    [place]ornin. Wash with over the counter benzoyl peroxide wash (examples include: Panoxyl Wash, Acne Free brand oil-free acne cleanser, Neutrogena Clear Pore Cleanser/Mask, Clean and Clear advantage 3 in 1 exfoliating cleanser, Clean and Clear Continuous Control Acne Cleanser, Oxy maximum face wash). Dry your face with white towel to prevent bleaching of clothing. 2. Apply an oil-free, non-comedogenic moisturizer, ideally with SPF 15+ in it. Night time:   1. Wash with a gentle face wash (such as Cerave or Cetaphil)  2. Apply a pea-sized amount of Tretinoin cream onto your finger. Dab the medicine onto your forehead, nose, chin, and each cheek. Then gently spread a thin layer across the entire face. Do not wash off this medicine. These medications can also be used on your chest, back and shoulder areas. 3. Apply an oil-free, non-comedogenic moisturizer. 4. Take spironolactone by mouth. Common side effects of spironolactone include increased urination, irregular periods with mid-cycle spotting, breast tenderness, decreased sex drive, fatigue, headache, and dizziness. Stop taking medication and to immediately report any new onset muscle cramps or weakness, or palpitations. Pregnancy needs to be avoided by use of birth control in order to prevent feminization of a male fetus. Stay away from potassium supplements while on the medication as there is a risk of high potassium levels. It is important to remember that oil glands respond very slowly and your skin will not change overnight. Your skin may get worse during the first weeks of treatment because all of the clogged pores are opening to the surface. Try to be consistent and follow these instructions from your doctor. If your acne has not responded to these treatments in 2-3 months, your doctor may recommend other medications.      Topical acne medications can cause irritation, redness, and dryness, especially when you first start them. If your prescribed topical cream or gel is too irritating at first, try using it every other day or once every 3 days instead of every day. As your skin becomes less sensitive, you may be able to start to use it every day. To help soothe the dryness, you can use an oil-free, \"non-comedogenic\" moisturizer, ideally with SPF in it. Some products available include: Cetaphil Lotion, Cerave Lotion, Neutrogenia Moisturizer and Aveeno Moisturizer. Some people find that applying their moisturizer immediately prior to the topical medication helps, and studies suggest that it does not reduce effectiveness. Please have your pharmacist call our office if you are having trouble getting your medications or need refills. Some insurance companies will not cover tretinoin; however, you may shop around for the best out-of-pocket price using the coupon website goodrx.com. Alternatively, you may purchase Differin (adapalene) gel over the counter and use in the same way. Topical and oral acne medications are not safe for pregnant women. No acne medications should be used by pregnant women without first consulting their physician.

## 2022-01-11 ENCOUNTER — TELEPHONE (OUTPATIENT)
Dept: DERMATOLOGY | Age: 27
End: 2022-01-11

## 2022-01-11 NOTE — TELEPHONE ENCOUNTER
Has been >1 year, cannot refill. Can use OTC differin or ask her PCP to refill tretinoin if PCP has seen her more reently.

## 2022-01-11 NOTE — TELEPHONE ENCOUNTER
Patient would like tretinoin refills until her appointment. I did explain the refill policy to the patient but she insisted that I request refills anyway.   Future Appointments   Date Time Provider Paula Rupali   4/27/2022 11:00 AM Anika Moreno MD  [facility]

## 2022-01-11 NOTE — TELEPHONE ENCOUNTER
Tried to call pt and the line said \" person you are trying to reach is not available\" and the hung up. Did not give me the option to leave vm.

## 2022-04-26 ENCOUNTER — TELEPHONE (OUTPATIENT)
Dept: DERMATOLOGY | Age: 27
End: 2022-04-26

## 2022-04-26 NOTE — TELEPHONE ENCOUNTER
I was unable to confirm appointment scheduled for 4/27/2022 in the Dermatology Department. The patient was unavailable and the voicemail was not accepting messages.

## 2023-04-26 ENCOUNTER — HOSPITAL ENCOUNTER (OUTPATIENT)
Age: 28
Discharge: HOME | End: 2023-04-26